# Patient Record
Sex: MALE | Race: BLACK OR AFRICAN AMERICAN | NOT HISPANIC OR LATINO | URBAN - METROPOLITAN AREA
[De-identification: names, ages, dates, MRNs, and addresses within clinical notes are randomized per-mention and may not be internally consistent; named-entity substitution may affect disease eponyms.]

---

## 2017-06-16 ENCOUNTER — INPATIENT (INPATIENT)
Facility: HOSPITAL | Age: 32
LOS: 2 days | Discharge: HOME | End: 2017-06-19
Attending: INTERNAL MEDICINE | Admitting: INTERNAL MEDICINE
Payer: COMMERCIAL

## 2017-06-16 VITALS
TEMPERATURE: 99 F | OXYGEN SATURATION: 98 % | RESPIRATION RATE: 16 BRPM | DIASTOLIC BLOOD PRESSURE: 123 MMHG | HEART RATE: 94 BPM | SYSTOLIC BLOOD PRESSURE: 179 MMHG

## 2017-06-16 DIAGNOSIS — I10 ESSENTIAL (PRIMARY) HYPERTENSION: ICD-10-CM

## 2017-06-16 DIAGNOSIS — I21.4 NON-ST ELEVATION (NSTEMI) MYOCARDIAL INFARCTION: ICD-10-CM

## 2017-06-16 LAB
ALBUMIN SERPL ELPH-MCNC: 4 G/DL — SIGNIFICANT CHANGE UP (ref 3.3–5)
ALP SERPL-CCNC: 76 U/L — SIGNIFICANT CHANGE UP (ref 40–120)
ALT FLD-CCNC: 23 U/L — SIGNIFICANT CHANGE UP (ref 4–41)
APTT BLD: 30 SEC — SIGNIFICANT CHANGE UP (ref 27.5–37.4)
APTT BLD: 31.1 SEC — SIGNIFICANT CHANGE UP (ref 27.5–37.4)
AST SERPL-CCNC: 22 U/L — SIGNIFICANT CHANGE UP (ref 4–40)
BASOPHILS # BLD AUTO: 0.02 K/UL — SIGNIFICANT CHANGE UP (ref 0–0.2)
BASOPHILS NFR BLD AUTO: 0.3 % — SIGNIFICANT CHANGE UP (ref 0–2)
BILIRUB SERPL-MCNC: 0.3 MG/DL — SIGNIFICANT CHANGE UP (ref 0.2–1.2)
BUN SERPL-MCNC: 13 MG/DL — SIGNIFICANT CHANGE UP (ref 7–23)
CALCIUM SERPL-MCNC: 9.5 MG/DL — SIGNIFICANT CHANGE UP (ref 8.4–10.5)
CHLORIDE SERPL-SCNC: 102 MMOL/L — SIGNIFICANT CHANGE UP (ref 98–107)
CK MB BLD-MCNC: 1.7 — SIGNIFICANT CHANGE UP (ref 0–2.5)
CK MB BLD-MCNC: 3.43 NG/ML — SIGNIFICANT CHANGE UP (ref 1–6.6)
CK MB BLD-MCNC: 3.79 NG/ML — SIGNIFICANT CHANGE UP (ref 1–6.6)
CK SERPL-CCNC: 199 U/L — SIGNIFICANT CHANGE UP (ref 30–200)
CK SERPL-CCNC: 238 U/L — HIGH (ref 30–200)
CO2 SERPL-SCNC: 24 MMOL/L — SIGNIFICANT CHANGE UP (ref 22–31)
CREAT SERPL-MCNC: 0.89 MG/DL — SIGNIFICANT CHANGE UP (ref 0.5–1.3)
EOSINOPHIL # BLD AUTO: 0.25 K/UL — SIGNIFICANT CHANGE UP (ref 0–0.5)
EOSINOPHIL NFR BLD AUTO: 3.7 % — SIGNIFICANT CHANGE UP (ref 0–6)
GLUCOSE SERPL-MCNC: 91 MG/DL — SIGNIFICANT CHANGE UP (ref 70–99)
HCT VFR BLD CALC: 42.7 % — SIGNIFICANT CHANGE UP (ref 39–50)
HCT VFR BLD CALC: 45 % — SIGNIFICANT CHANGE UP (ref 39–50)
HGB BLD-MCNC: 14 G/DL — SIGNIFICANT CHANGE UP (ref 13–17)
HGB BLD-MCNC: 14.4 G/DL — SIGNIFICANT CHANGE UP (ref 13–17)
IMM GRANULOCYTES NFR BLD AUTO: 0.1 % — SIGNIFICANT CHANGE UP (ref 0–1.5)
LYMPHOCYTES # BLD AUTO: 2.74 K/UL — SIGNIFICANT CHANGE UP (ref 1–3.3)
LYMPHOCYTES # BLD AUTO: 40.5 % — SIGNIFICANT CHANGE UP (ref 13–44)
MAGNESIUM SERPL-MCNC: 1.8 MG/DL — SIGNIFICANT CHANGE UP (ref 1.6–2.6)
MCHC RBC-ENTMCNC: 29.6 PG — SIGNIFICANT CHANGE UP (ref 27–34)
MCHC RBC-ENTMCNC: 29.6 PG — SIGNIFICANT CHANGE UP (ref 27–34)
MCHC RBC-ENTMCNC: 32 % — SIGNIFICANT CHANGE UP (ref 32–36)
MCHC RBC-ENTMCNC: 32.8 % — SIGNIFICANT CHANGE UP (ref 32–36)
MCV RBC AUTO: 90.3 FL — SIGNIFICANT CHANGE UP (ref 80–100)
MCV RBC AUTO: 92.6 FL — SIGNIFICANT CHANGE UP (ref 80–100)
MONOCYTES # BLD AUTO: 0.39 K/UL — SIGNIFICANT CHANGE UP (ref 0–0.9)
MONOCYTES NFR BLD AUTO: 5.8 % — SIGNIFICANT CHANGE UP (ref 2–14)
NEUTROPHILS # BLD AUTO: 3.35 K/UL — SIGNIFICANT CHANGE UP (ref 1.8–7.4)
NEUTROPHILS NFR BLD AUTO: 49.6 % — SIGNIFICANT CHANGE UP (ref 43–77)
NT-PROBNP SERPL-SCNC: 582 PG/ML — SIGNIFICANT CHANGE UP
PLATELET # BLD AUTO: 217 K/UL — SIGNIFICANT CHANGE UP (ref 150–400)
PLATELET # BLD AUTO: 230 K/UL — SIGNIFICANT CHANGE UP (ref 150–400)
PMV BLD: 10.8 FL — SIGNIFICANT CHANGE UP (ref 7–13)
PMV BLD: 9.9 FL — SIGNIFICANT CHANGE UP (ref 7–13)
POTASSIUM SERPL-MCNC: 4.9 MMOL/L — SIGNIFICANT CHANGE UP (ref 3.5–5.3)
POTASSIUM SERPL-SCNC: 4.9 MMOL/L — SIGNIFICANT CHANGE UP (ref 3.5–5.3)
PROT SERPL-MCNC: 7.7 G/DL — SIGNIFICANT CHANGE UP (ref 6–8.3)
RBC # BLD: 4.73 M/UL — SIGNIFICANT CHANGE UP (ref 4.2–5.8)
RBC # BLD: 4.86 M/UL — SIGNIFICANT CHANGE UP (ref 4.2–5.8)
RBC # FLD: 14.7 % — HIGH (ref 10.3–14.5)
RBC # FLD: 14.8 % — HIGH (ref 10.3–14.5)
SODIUM SERPL-SCNC: 140 MMOL/L — SIGNIFICANT CHANGE UP (ref 135–145)
TROPONIN T SERPL-MCNC: 0.07 NG/ML — HIGH (ref 0–0.06)
TROPONIN T SERPL-MCNC: 0.07 NG/ML — HIGH (ref 0–0.06)
TSH SERPL-MCNC: 3.64 UIU/ML — SIGNIFICANT CHANGE UP (ref 0.27–4.2)
WBC # BLD: 6.76 K/UL — SIGNIFICANT CHANGE UP (ref 3.8–10.5)
WBC # BLD: 7.72 K/UL — SIGNIFICANT CHANGE UP (ref 3.8–10.5)
WBC # FLD AUTO: 6.76 K/UL — SIGNIFICANT CHANGE UP (ref 3.8–10.5)
WBC # FLD AUTO: 7.72 K/UL — SIGNIFICANT CHANGE UP (ref 3.8–10.5)

## 2017-06-16 RX ORDER — ASPIRIN/CALCIUM CARB/MAGNESIUM 324 MG
81 TABLET ORAL DAILY
Qty: 0 | Refills: 0 | Status: DISCONTINUED | OUTPATIENT
Start: 2017-06-16 | End: 2017-06-19

## 2017-06-16 RX ORDER — METOPROLOL TARTRATE 50 MG
50 TABLET ORAL
Qty: 0 | Refills: 0 | Status: DISCONTINUED | OUTPATIENT
Start: 2017-06-16 | End: 2017-06-17

## 2017-06-16 RX ORDER — HEPARIN SODIUM 5000 [USP'U]/ML
5000 INJECTION INTRAVENOUS; SUBCUTANEOUS ONCE
Qty: 0 | Refills: 0 | Status: COMPLETED | OUTPATIENT
Start: 2017-06-16 | End: 2017-06-16

## 2017-06-16 RX ORDER — HEPARIN SODIUM 5000 [USP'U]/ML
6000 INJECTION INTRAVENOUS; SUBCUTANEOUS EVERY 6 HOURS
Qty: 0 | Refills: 0 | Status: DISCONTINUED | OUTPATIENT
Start: 2017-06-16 | End: 2017-06-17

## 2017-06-16 RX ORDER — HEPARIN SODIUM 5000 [USP'U]/ML
INJECTION INTRAVENOUS; SUBCUTANEOUS
Qty: 25000 | Refills: 0 | Status: DISCONTINUED | OUTPATIENT
Start: 2017-06-16 | End: 2017-06-17

## 2017-06-16 RX ORDER — LISINOPRIL 2.5 MG/1
10 TABLET ORAL DAILY
Qty: 0 | Refills: 0 | Status: DISCONTINUED | OUTPATIENT
Start: 2017-06-16 | End: 2017-06-18

## 2017-06-16 RX ORDER — ACETAMINOPHEN 500 MG
650 TABLET ORAL EVERY 6 HOURS
Qty: 0 | Refills: 0 | Status: DISCONTINUED | OUTPATIENT
Start: 2017-06-16 | End: 2017-06-19

## 2017-06-16 RX ORDER — FUROSEMIDE 40 MG
20 TABLET ORAL
Qty: 0 | Refills: 0 | Status: DISCONTINUED | OUTPATIENT
Start: 2017-06-16 | End: 2017-06-17

## 2017-06-16 RX ORDER — AMLODIPINE BESYLATE 2.5 MG/1
5 TABLET ORAL ONCE
Qty: 0 | Refills: 0 | Status: COMPLETED | OUTPATIENT
Start: 2017-06-16 | End: 2017-06-16

## 2017-06-16 RX ORDER — LANOLIN ALCOHOL/MO/W.PET/CERES
3 CREAM (GRAM) TOPICAL AT BEDTIME
Qty: 0 | Refills: 0 | Status: DISCONTINUED | OUTPATIENT
Start: 2017-06-16 | End: 2017-06-19

## 2017-06-16 RX ORDER — ATORVASTATIN CALCIUM 80 MG/1
20 TABLET, FILM COATED ORAL AT BEDTIME
Qty: 0 | Refills: 0 | Status: DISCONTINUED | OUTPATIENT
Start: 2017-06-16 | End: 2017-06-19

## 2017-06-16 RX ADMIN — AMLODIPINE BESYLATE 5 MILLIGRAM(S): 2.5 TABLET ORAL at 13:33

## 2017-06-16 RX ADMIN — LISINOPRIL 10 MILLIGRAM(S): 2.5 TABLET ORAL at 20:12

## 2017-06-16 RX ADMIN — HEPARIN SODIUM 1000 UNIT(S)/HR: 5000 INJECTION INTRAVENOUS; SUBCUTANEOUS at 16:32

## 2017-06-16 RX ADMIN — HEPARIN SODIUM 5000 UNIT(S): 5000 INJECTION INTRAVENOUS; SUBCUTANEOUS at 16:32

## 2017-06-16 RX ADMIN — Medication 50 MILLIGRAM(S): at 20:12

## 2017-06-16 NOTE — H&P ADULT - NSHPSOCIALHISTORY_GEN_ALL_CORE
Marital Status: Single - lives with his girlfriend with their 2 children (boys)    Occupation: Works in [employer]    Tobacco Use: neg    ETOH Use: drinks 1 day per week    Flu Vaccine:     neg                             Pneumonia Vaccine:   neg                Hep B vaccine: completed the series

## 2017-06-16 NOTE — H&P ADULT - NEGATIVE OPHTHALMOLOGIC SYMPTOMS
no blurred vision R/no loss of vision L/no blurred vision L/no loss of vision R/no photophobia/no diplopia

## 2017-06-16 NOTE — H&P ADULT - PROBLEM SELECTOR PLAN 1
ekg/telemetry, f/u ce x 2, mg, tsh, echo, heparin gtt, monitor ptt, started on beta blocker, acei, statin

## 2017-06-16 NOTE — H&P ADULT - NEGATIVE CARDIOVASCULAR SYMPTOMS
no orthopnea/no paroxysmal nocturnal dyspnea/no peripheral edema/no palpitations/no dyspnea on exertion/no chest pain

## 2017-06-16 NOTE — H&P ADULT - RS GEN PE MLT RESP DETAILS PC
breath sounds equal/no chest wall tenderness/good air movement/respirations non-labored/clear to auscultation bilaterally/airway patent

## 2017-06-16 NOTE — ED PROVIDER NOTE - OBJECTIVE STATEMENT
30 yo M with cough x4 weeks, went to urgent care and sent to ED for elevated BP. Pt states he has had mostly dry cough with intermittent whitish-yellow phlegm. 30 yo M with cough x4 weeks, went to urgent  for c/o dry cough with intermittent whitish-yellow phlegm. Pt found to have elevated BP, sent to ED for eval. CXR at urgent care (+) for "enlarged heart" Pt has been told his BP was high in the past, never started on meds. Pt denies HA, blurry vision, CP, SOB, back pain, nausea.

## 2017-06-16 NOTE — ED ADULT NURSE NOTE - CHIEF COMPLAINT QUOTE
Patient sent in by urgent care center for elevated blood pressure. Denies headache, N/V, blurry vision, chest pain, SOB. Seen at urgent care for night time/ early morning cough x 1 month. Negative chest xray at urgent care.

## 2017-06-16 NOTE — ED ADULT NURSE NOTE - OBJECTIVE STATEMENT
Facilitator RN : Pt received to intake 8 aaox3 ambulatory c/o htn.  Pt reports going to urgentcare for productive cough x 3 wks where he was found to be hypertensive.  Pt denies significant pmh, NO chest pain, SOB, NVD fevers chills, dizziness or HA.  P/w breaths even unlabored skin wrm dry intact NAD noted.  20 g IV access obtained at .  labs and meds as ordered.  Pt cleared to transport to .

## 2017-06-16 NOTE — ED PROVIDER NOTE - ATTENDING CONTRIBUTION TO CARE
agree with resident note  31 yr old male with no PMH presents from [facility] after noted to have extremely elevated BP.  States went for dry cough.  Denies CP, SOB, palpitations.  Denies drug use. Has FH of CAD grandfather in 40s    PE: extremely hypertensive, in no distress; CTAB/L; s1 s2 no m/r/g pulses equal bilateral abd soft/NT/ND ext: no edema Neuro:CNs intact 5/5 motor UE and LE; sensation intact    Imp: EKG shows lateral TWI; pt in no distress, no neuro symptoms; trop +; admit to tele; ASA and heparin for NSTEMI

## 2017-06-16 NOTE — H&P ADULT - HISTORY OF PRESENT ILLNESS
32 y/o male, with a PmHx of Obesity, presented to [facility] c/o coughing for the past 4 weeks with some nasal congestion. Pt states for the past 4 week he has been having a non-productive cough (but occasionally has some clear phlegm that comes up) and it has been getting worse so today he decided to go to an Urgent Care Center for an evaluation. There he had a CXR done and he was told he had an "enlarged heart" so he was sent to the ED for an evaluation. He denies any fever, chills, sob, HA, dizziness, blurred vision, n/v, recent travel, sick contacts. Pt states he last went to his PMD x 1 year ago for his annual physical and was told everything was okay (he can't remember his PMD's name). In the ED, he was found to have an elevated troponin in the setting of a normal kidney function so he was started on a Heparin gtt. He appears comfortable at this time and is being admitted to telemetry for NSTEMI vs Myocarditis.

## 2017-06-16 NOTE — H&P ADULT - NSHPPHYSICALEXAM_GEN_ALL_CORE
Vital Signs Last 24 Hrs  T(C): 36.8, Max: 37.1 (06-16 @ 11:26)  T(F): 98.2, Max: 98.7 (06-16 @ 11:26)  HR: 87 (87 - 94)  BP: 191/125 (174/117 - 191/125)  BP(mean): --  RR: 17 (16 - 18)  SpO2: 99% (98% - 100%)    EKG: NSR @ 91, T inv V4-6

## 2017-06-17 DIAGNOSIS — R05 COUGH: ICD-10-CM

## 2017-06-17 LAB
APTT BLD: 29.9 SEC — SIGNIFICANT CHANGE UP (ref 27.5–37.4)
B PERT DNA SPEC QL NAA+PROBE: SIGNIFICANT CHANGE UP
BUN SERPL-MCNC: 11 MG/DL — SIGNIFICANT CHANGE UP (ref 7–23)
C PNEUM DNA SPEC QL NAA+PROBE: NOT DETECTED — SIGNIFICANT CHANGE UP
CALCIUM SERPL-MCNC: 9.2 MG/DL — SIGNIFICANT CHANGE UP (ref 8.4–10.5)
CHLORIDE SERPL-SCNC: 101 MMOL/L — SIGNIFICANT CHANGE UP (ref 98–107)
CHOLEST SERPL-MCNC: 171 MG/DL — SIGNIFICANT CHANGE UP (ref 120–199)
CO2 SERPL-SCNC: 22 MMOL/L — SIGNIFICANT CHANGE UP (ref 22–31)
CREAT SERPL-MCNC: 0.74 MG/DL — SIGNIFICANT CHANGE UP (ref 0.5–1.3)
FLUAV H1 2009 PAND RNA SPEC QL NAA+PROBE: NOT DETECTED — SIGNIFICANT CHANGE UP
FLUAV H1 RNA SPEC QL NAA+PROBE: NOT DETECTED — SIGNIFICANT CHANGE UP
FLUAV H3 RNA SPEC QL NAA+PROBE: NOT DETECTED — SIGNIFICANT CHANGE UP
FLUAV SUBTYP SPEC NAA+PROBE: SIGNIFICANT CHANGE UP
FLUBV RNA SPEC QL NAA+PROBE: NOT DETECTED — SIGNIFICANT CHANGE UP
GLUCOSE SERPL-MCNC: 100 MG/DL — HIGH (ref 70–99)
HADV DNA SPEC QL NAA+PROBE: NOT DETECTED — SIGNIFICANT CHANGE UP
HCOV 229E RNA SPEC QL NAA+PROBE: NOT DETECTED — SIGNIFICANT CHANGE UP
HCOV HKU1 RNA SPEC QL NAA+PROBE: NOT DETECTED — SIGNIFICANT CHANGE UP
HCOV NL63 RNA SPEC QL NAA+PROBE: NOT DETECTED — SIGNIFICANT CHANGE UP
HCOV OC43 RNA SPEC QL NAA+PROBE: NOT DETECTED — SIGNIFICANT CHANGE UP
HCT VFR BLD CALC: 45.9 % — SIGNIFICANT CHANGE UP (ref 39–50)
HDLC SERPL-MCNC: 53 MG/DL — SIGNIFICANT CHANGE UP (ref 35–55)
HGB BLD-MCNC: 14.9 G/DL — SIGNIFICANT CHANGE UP (ref 13–17)
HMPV RNA SPEC QL NAA+PROBE: NOT DETECTED — SIGNIFICANT CHANGE UP
HPIV1 RNA SPEC QL NAA+PROBE: NOT DETECTED — SIGNIFICANT CHANGE UP
HPIV2 RNA SPEC QL NAA+PROBE: NOT DETECTED — SIGNIFICANT CHANGE UP
HPIV3 RNA SPEC QL NAA+PROBE: NOT DETECTED — SIGNIFICANT CHANGE UP
HPIV4 RNA SPEC QL NAA+PROBE: NOT DETECTED — SIGNIFICANT CHANGE UP
INR BLD: 1.1 — SIGNIFICANT CHANGE UP (ref 0.88–1.17)
LIPID PNL WITH DIRECT LDL SERPL: 106 MG/DL — SIGNIFICANT CHANGE UP
M PNEUMO DNA SPEC QL NAA+PROBE: NOT DETECTED — SIGNIFICANT CHANGE UP
MCHC RBC-ENTMCNC: 29.5 PG — SIGNIFICANT CHANGE UP (ref 27–34)
MCHC RBC-ENTMCNC: 32.5 % — SIGNIFICANT CHANGE UP (ref 32–36)
MCV RBC AUTO: 90.9 FL — SIGNIFICANT CHANGE UP (ref 80–100)
PLATELET # BLD AUTO: 220 K/UL — SIGNIFICANT CHANGE UP (ref 150–400)
PMV BLD: 10.6 FL — SIGNIFICANT CHANGE UP (ref 7–13)
POTASSIUM SERPL-MCNC: 3.9 MMOL/L — SIGNIFICANT CHANGE UP (ref 3.5–5.3)
POTASSIUM SERPL-SCNC: 3.9 MMOL/L — SIGNIFICANT CHANGE UP (ref 3.5–5.3)
PROTHROM AB SERPL-ACNC: 12.3 SEC — SIGNIFICANT CHANGE UP (ref 9.8–13.1)
RBC # BLD: 5.05 M/UL — SIGNIFICANT CHANGE UP (ref 4.2–5.8)
RBC # FLD: 14.9 % — HIGH (ref 10.3–14.5)
RSV RNA SPEC QL NAA+PROBE: NOT DETECTED — SIGNIFICANT CHANGE UP
RV+EV RNA SPEC QL NAA+PROBE: NOT DETECTED — SIGNIFICANT CHANGE UP
SODIUM SERPL-SCNC: 139 MMOL/L — SIGNIFICANT CHANGE UP (ref 135–145)
TRIGL SERPL-MCNC: 64 MG/DL — SIGNIFICANT CHANGE UP (ref 10–149)
TROPONIN T SERPL-MCNC: 0.07 NG/ML — HIGH (ref 0–0.06)
WBC # BLD: 6.31 K/UL — SIGNIFICANT CHANGE UP (ref 3.8–10.5)
WBC # FLD AUTO: 6.31 K/UL — SIGNIFICANT CHANGE UP (ref 3.8–10.5)

## 2017-06-17 PROCEDURE — 71250 CT THORAX DX C-: CPT | Mod: 26

## 2017-06-17 PROCEDURE — 71020: CPT | Mod: 26

## 2017-06-17 RX ORDER — FUROSEMIDE 40 MG
40 TABLET ORAL DAILY
Qty: 0 | Refills: 0 | Status: DISCONTINUED | OUTPATIENT
Start: 2017-06-17 | End: 2017-06-19

## 2017-06-17 RX ORDER — HEPARIN SODIUM 5000 [USP'U]/ML
5000 INJECTION INTRAVENOUS; SUBCUTANEOUS EVERY 8 HOURS
Qty: 0 | Refills: 0 | Status: DISCONTINUED | OUTPATIENT
Start: 2017-06-17 | End: 2017-06-19

## 2017-06-17 RX ORDER — CARVEDILOL PHOSPHATE 80 MG/1
6.25 CAPSULE, EXTENDED RELEASE ORAL EVERY 12 HOURS
Qty: 0 | Refills: 0 | Status: DISCONTINUED | OUTPATIENT
Start: 2017-06-17 | End: 2017-06-19

## 2017-06-17 RX ADMIN — Medication 20 MILLIGRAM(S): at 06:34

## 2017-06-17 RX ADMIN — HEPARIN SODIUM 5000 UNIT(S): 5000 INJECTION INTRAVENOUS; SUBCUTANEOUS at 13:44

## 2017-06-17 RX ADMIN — Medication 50 MILLIGRAM(S): at 06:34

## 2017-06-17 RX ADMIN — HEPARIN SODIUM 1300 UNIT(S)/HR: 5000 INJECTION INTRAVENOUS; SUBCUTANEOUS at 00:14

## 2017-06-17 RX ADMIN — CARVEDILOL PHOSPHATE 6.25 MILLIGRAM(S): 80 CAPSULE, EXTENDED RELEASE ORAL at 18:04

## 2017-06-17 RX ADMIN — HEPARIN SODIUM 1600 UNIT(S)/HR: 5000 INJECTION INTRAVENOUS; SUBCUTANEOUS at 08:21

## 2017-06-17 RX ADMIN — ATORVASTATIN CALCIUM 20 MILLIGRAM(S): 80 TABLET, FILM COATED ORAL at 00:24

## 2017-06-17 RX ADMIN — ATORVASTATIN CALCIUM 20 MILLIGRAM(S): 80 TABLET, FILM COATED ORAL at 21:40

## 2017-06-17 RX ADMIN — LISINOPRIL 10 MILLIGRAM(S): 2.5 TABLET ORAL at 06:34

## 2017-06-17 RX ADMIN — Medication 81 MILLIGRAM(S): at 13:44

## 2017-06-17 RX ADMIN — HEPARIN SODIUM 6000 UNIT(S): 5000 INJECTION INTRAVENOUS; SUBCUTANEOUS at 08:21

## 2017-06-17 RX ADMIN — HEPARIN SODIUM 6000 UNIT(S): 5000 INJECTION INTRAVENOUS; SUBCUTANEOUS at 00:24

## 2017-06-17 RX ADMIN — HEPARIN SODIUM 5000 UNIT(S): 5000 INJECTION INTRAVENOUS; SUBCUTANEOUS at 21:40

## 2017-06-17 NOTE — CONSULT NOTE ADULT - SUBJECTIVE AND OBJECTIVE BOX
Patient is a 31y old  Male who presents with a chief complaint of "I had a cough and went to Urgent Care, I was sent to the ER for high blood pressure".      HPI:  32 y/o male, with a PmHx of Obesity, presented to [facility] c/o coughing for the past 4 weeks . Pt states for the past 4 week he has been having a non-productive cough (but occasionally has some clear phlegm that comes up) and it has been getting worse so today he decided to go to an Urgent Care Center for an evaluation. There he had a CXR done and he was told he had an "enlarged heart" so he was sent to the ED for an evaluation. He denies any fever, chills, sob, HA, dizziness, blurred vision, n/v, recent travel, sick contacts. Pt states he last went to his PMD x 1 year ago for his annual physical and was told everything was okay (he can't remember his PMD's name). In the ED, he was found to have an elevated troponin in the setting of a normal kidney function so he was started on a Heparin gtt. He appears comfortable at this time and is being admitted to telemetry for NSTEMI vs Myocarditis.       PAST MEDICAL & SURGICAL HISTORY:  No pertinent past medical history  No significant past surgical history      Social History:    FAMILY HISTORY:  No pertinent family history in first degree relatives      Allergies    No Known Allergies    Intolerances        REVIEW OF SYSTEMS:    CONSTITUTIONAL: No fever, weight loss, or fatigue  EYES: No eye pain, visual disturbances, or discharge  RESPIRATORY: No cough, wheezing, chills or hemoptysis; No shortness of breath  CARDIOVASCULAR: No chest pain, palpitations, dizziness, or leg swelling  GASTROINTESTINAL: No abdominal or epigastric pain. No nausea, vomiting, or hematemesis; No diarrhea or constipation. No melena or hematochezia.  GENITOURINARY: No dysuria, frequency, hematuria, or incontinence  NEUROLOGICAL: No headaches, memory loss, loss of strength, numbness, or tremors  SKIN: No itching, burning, rashes, or lesions   ENDOCRINE: No heat or cold intolerance; No hair loss  MUSCULOSKELETAL: No joint pain or swelling; No muscle, back, or extremity pain  PSYCHIATRIC: No depression, anxiety, mood swings, or difficulty sleeping      MEDICATIONS  (STANDING):  aspirin enteric coated 81milliGRAM(s) Oral daily  lisinopril 10milliGRAM(s) Oral daily  atorvastatin 20milliGRAM(s) Oral at bedtime  carvedilol 6.25milliGRAM(s) Oral every 12 hours  heparin  Injectable 5000Unit(s) SubCutaneous every 8 hours  furosemide    Tablet 40milliGRAM(s) Oral daily    MEDICATIONS  (PRN):  acetaminophen   Tablet. 650milliGRAM(s) Oral every 6 hours PRN mild, moderate pain  melatonin 3milliGRAM(s) Oral at bedtime PRN Insomnia      Vital Signs Last 24 Hrs  T(C): 36.7, Max: 37.1 (06-16 @ 15:47)  T(F): 98, Max: 98.7 (06-16 @ 15:47)  HR: 84 (84 - 94)  BP: 155/106 (150/105 - 191/125)  BP(mean): --  RR: 17 (17 - 18)  SpO2: 98% (98% - 100%)    PHYSICAL EXAM:    GENERAL: NAD, well-groomed, well-developed,Obese  HEAD:  Atraumatic, Normocephalic  EYES: EOMI, PERRLA, conjunctiva and sclera clear  ENMT: No tonsillar erythema, exudates, or enlargement; Moist mucous membranes, Good dentition, No lesions  NECK: Supple, No JVD, Normal thyroid  NERVOUS SYSTEM:  Alert & Oriented X3, Good concentration; Motor Strength 5/5 B/L upper and lower extremities; DTRs 2+ intact and symmetric  CHEST/LUNG: Clear to percussion bilaterally; No rales, rhonchi, wheezing, or rubs  HEART: Regular rate and rhythm; No murmurs, rubs, or gallops  ABDOMEN: Soft, Nontender, Nondistended; Bowel sounds present  EXTREMITIES:  2+ Peripheral Pulses, No clubbing, cyanosis, or edema  LYMPH: No lymphadenopathy noted  SKIN: No rashes or lesions    LABS:                        14.9   6.31  )-----------( 220      ( 17 Jun 2017 07:39 )             45.9     06-17    139  |  101  |  11  ----------------------------<  100<H>  3.9   |  22  |  0.74    Ca    9.2      17 Jun 2017 07:39  Mg     1.8     06-16    TPro  7.7  /  Alb  4.0  /  TBili  0.3  /  DBili  x   /  AST  22  /  ALT  23  /  AlkPhos  76  06-16    PT/INR - ( 17 Jun 2017 07:39 )   PT: 12.3 SEC;   INR: 1.10          PTT - ( 17 Jun 2017 07:39 )  PTT:29.9 SEC        RADIOLOGY & ADDITIONAL STUDIES:

## 2017-06-17 NOTE — PROGRESS NOTE ADULT - ASSESSMENT
Poorly Controlled Hypertension  Edema  Cough    -CV status appears stable  -clinical picture and troponin level not consistent with ACS. Troponin borderline with negative CKMB and no symptoms of chest pain  -Obtain an echo to rule out effusion or cardiomyopathy  -Change lasix to 40mg PO daily  -D/C heparin gtt  -check resp viral panel  -noncon chest CT  -if blood pressure remains elevated, increase lisinopril to 20mg PO daily  -check LE dopplers

## 2017-06-17 NOTE — CONSULT NOTE ADULT - PROBLEM SELECTOR RECOMMENDATION 9
OFF Heparin and on BB plus ASA. NO chest pain or SOB. Echo pending. ?Myocarditis. Cardiology following.

## 2017-06-17 NOTE — PROGRESS NOTE ADULT - SUBJECTIVE AND OBJECTIVE BOX
CC: Pt seen and examined, please see full Hand P in sunrise  Pt is a 30 yo male presenting w 4 weeks of productive dry cough, sent in for evaluation after he was found to be hypertensive at an urgent care. Initial bloodwork in the ED revealed a minimal troponin elevation with negative CK, CKMB and pro-BNP. Pt denies any chest pain, dyspnea, palpitations, orthopnea or PND    TELEMETRY:     PHYSICAL EXAM:    T(C): 36.7, Max: 37.1 (06-16 @ 11:26)  HR: 84 (84 - 94)  BP: 155/106 (150/105 - 191/125)  RR: 17 (16 - 18)  SpO2: 98% (98% - 100%)  Wt(kg): --  I&O's Summary    I & Os for current day (as of 17 Jun 2017 09:35)  =============================================  IN: 0 ml / OUT: 1000 ml / NET: -1000 ml      Appearance: Normal	  Cardiovascular: Normal S1 S2,RRR, No JVD, No murmurs  Respiratory: Lungs clear to auscultation	  Gastrointestinal:  Soft, Non-tender, + BS	  Extremities: + nonpitting edema of the b/l LE  Vascular: Peripheral pulses palpable 2+ bilaterally     LABS:	 	                          14.9   6.31  )-----------( 220      ( 17 Jun 2017 07:39 )             45.9     06-17    139  |  101  |  11  ----------------------------<  100<H>  3.9   |  22  |  0.74    Ca    9.2      17 Jun 2017 07:39  Mg     1.8     06-16    TPro  7.7  /  Alb  4.0  /  TBili  0.3  /  DBili  x   /  AST  22  /  ALT  23  /  AlkPhos  76  06-16      PT/INR - ( 17 Jun 2017 07:39 )   PT: 12.3 SEC;   INR: 1.10          PTT - ( 17 Jun 2017 07:39 )  PTT:29.9 SEC    CARDIAC MARKERS:      CKMB: 3.43 ng/mL (06-16 @ 20:29)  CKMB: 3.79 ng/mL (06-16 @ 13:29)

## 2017-06-17 NOTE — CONSULT NOTE ADULT - ASSESSMENT
30 y/o male, with no significant  PmHx  presented to [facility] c/o coughing for the past 4 weeks .

## 2017-06-17 NOTE — CONSULT NOTE ADULT - PROBLEM SELECTOR RECOMMENDATION 3
Improving..?Viral as no associated symptom like fever,chills,night sweats PND or GERD. If continues will get CT chest without contrast.

## 2017-06-18 LAB
BUN SERPL-MCNC: 14 MG/DL — SIGNIFICANT CHANGE UP (ref 7–23)
CALCIUM SERPL-MCNC: 8.9 MG/DL — SIGNIFICANT CHANGE UP (ref 8.4–10.5)
CHLORIDE SERPL-SCNC: 101 MMOL/L — SIGNIFICANT CHANGE UP (ref 98–107)
CO2 SERPL-SCNC: 23 MMOL/L — SIGNIFICANT CHANGE UP (ref 22–31)
CREAT SERPL-MCNC: 0.83 MG/DL — SIGNIFICANT CHANGE UP (ref 0.5–1.3)
GLUCOSE SERPL-MCNC: 101 MG/DL — HIGH (ref 70–99)
HCT VFR BLD CALC: 45.4 % — SIGNIFICANT CHANGE UP (ref 39–50)
HGB BLD-MCNC: 14.8 G/DL — SIGNIFICANT CHANGE UP (ref 13–17)
MAGNESIUM SERPL-MCNC: 1.8 MG/DL — SIGNIFICANT CHANGE UP (ref 1.6–2.6)
MCHC RBC-ENTMCNC: 29.4 PG — SIGNIFICANT CHANGE UP (ref 27–34)
MCHC RBC-ENTMCNC: 32.6 % — SIGNIFICANT CHANGE UP (ref 32–36)
MCV RBC AUTO: 90.3 FL — SIGNIFICANT CHANGE UP (ref 80–100)
PLATELET # BLD AUTO: 207 K/UL — SIGNIFICANT CHANGE UP (ref 150–400)
PMV BLD: 10.7 FL — SIGNIFICANT CHANGE UP (ref 7–13)
POTASSIUM SERPL-MCNC: 3.9 MMOL/L — SIGNIFICANT CHANGE UP (ref 3.5–5.3)
POTASSIUM SERPL-SCNC: 3.9 MMOL/L — SIGNIFICANT CHANGE UP (ref 3.5–5.3)
RBC # BLD: 5.03 M/UL — SIGNIFICANT CHANGE UP (ref 4.2–5.8)
RBC # FLD: 14.8 % — HIGH (ref 10.3–14.5)
SODIUM SERPL-SCNC: 139 MMOL/L — SIGNIFICANT CHANGE UP (ref 135–145)
WBC # BLD: 5.27 K/UL — SIGNIFICANT CHANGE UP (ref 3.8–10.5)
WBC # FLD AUTO: 5.27 K/UL — SIGNIFICANT CHANGE UP (ref 3.8–10.5)

## 2017-06-18 PROCEDURE — 93970 EXTREMITY STUDY: CPT | Mod: 26

## 2017-06-18 RX ORDER — LISINOPRIL 2.5 MG/1
20 TABLET ORAL DAILY
Qty: 0 | Refills: 0 | Status: DISCONTINUED | OUTPATIENT
Start: 2017-06-18 | End: 2017-06-19

## 2017-06-18 RX ADMIN — Medication 81 MILLIGRAM(S): at 16:04

## 2017-06-18 RX ADMIN — HEPARIN SODIUM 5000 UNIT(S): 5000 INJECTION INTRAVENOUS; SUBCUTANEOUS at 05:49

## 2017-06-18 RX ADMIN — Medication 40 MILLIGRAM(S): at 05:49

## 2017-06-18 RX ADMIN — ATORVASTATIN CALCIUM 20 MILLIGRAM(S): 80 TABLET, FILM COATED ORAL at 22:05

## 2017-06-18 RX ADMIN — LISINOPRIL 20 MILLIGRAM(S): 2.5 TABLET ORAL at 16:04

## 2017-06-18 RX ADMIN — CARVEDILOL PHOSPHATE 6.25 MILLIGRAM(S): 80 CAPSULE, EXTENDED RELEASE ORAL at 05:49

## 2017-06-18 RX ADMIN — CARVEDILOL PHOSPHATE 6.25 MILLIGRAM(S): 80 CAPSULE, EXTENDED RELEASE ORAL at 18:09

## 2017-06-18 RX ADMIN — HEPARIN SODIUM 5000 UNIT(S): 5000 INJECTION INTRAVENOUS; SUBCUTANEOUS at 22:05

## 2017-06-18 RX ADMIN — LISINOPRIL 10 MILLIGRAM(S): 2.5 TABLET ORAL at 05:49

## 2017-06-18 NOTE — PROGRESS NOTE ADULT - SUBJECTIVE AND OBJECTIVE BOX
INTERVAL HPI/OVERNIGHT EVENTS: Feel fine with no CP or SOB.   Vital Signs Last 24 Hrs  T(C): 36.3, Max: 36.8 (06-17 @ 21:36)  T(F): 97.3, Max: 98.2 (06-17 @ 21:36)  HR: 81 (81 - 88)  BP: 170/120 (146/92 - 170/120)  BP(mean): --  RR: 18 (18 - 18)  SpO2: 96% (95% - 98%)  I&O's Summary  I & Os for 24h ending 18 Jun 2017 07:00  =============================================  IN: 1160 ml / OUT: 3075 ml / NET: -1915 ml    I & Os for current day (as of 18 Jun 2017 16:21)  =============================================  IN: 0 ml / OUT: 725 ml / NET: -725 ml    MEDICATIONS  (STANDING):  aspirin enteric coated 81milliGRAM(s) Oral daily  atorvastatin 20milliGRAM(s) Oral at bedtime  carvedilol 6.25milliGRAM(s) Oral every 12 hours  heparin  Injectable 5000Unit(s) SubCutaneous every 8 hours  furosemide    Tablet 40milliGRAM(s) Oral daily  lisinopril 20milliGRAM(s) Oral daily    MEDICATIONS  (PRN):  acetaminophen   Tablet. 650milliGRAM(s) Oral every 6 hours PRN mild, moderate pain  melatonin 3milliGRAM(s) Oral at bedtime PRN Insomnia    LABS:                        14.8   5.27  )-----------( 207      ( 18 Jun 2017 07:00 )             45.4     06-18    139  |  101  |  14  ----------------------------<  101<H>  3.9   |  23  |  0.83    Ca    8.9      18 Jun 2017 07:17  Mg     1.8     06-18      PT/INR - ( 17 Jun 2017 07:39 )   PT: 12.3 SEC;   INR: 1.10          PTT - ( 17 Jun 2017 07:39 )  PTT:29.9 SEC    CAPILLARY BLOOD GLUCOSE        Consultant(s) Notes Reviewed:  [x ] YES  [ ] NO    PHYSICAL EXAM:  GENERAL: NAD, well-groomed, well-developed,Obese   HEAD:  Atraumatic, Normocephalic  EYES: EOMI, PERRLA, conjunctiva and sclera clear  ENMT: No tonsillar erythema, exudates, or enlargement; Moist mucous membranes, Good dentition, No lesions  NECK: Supple, No JVD, Normal thyroid  NERVOUS SYSTEM:  Alert & Oriented X3, Good concentration; Motor Strength 5/5 B/L upper and lower extremities; DTRs 2+ intact and symmetric  CHEST/LUNG: Clear to percussion bilaterally; No rales, rhonchi, wheezing, or rubs  HEART: Regular rate and rhythm; No murmurs, rubs, or gallops  ABDOMEN: Soft, Nontender, Nondistended; Bowel sounds present  EXTREMITIES:  2+ Peripheral Pulses, No clubbing, cyanosis, or edema  LYMPH: No lymphadenopathy noted  SKIN: No rashes or lesions    Care Discussed with Consultants/Other Providers [ x] YES  [ ] NO

## 2017-06-18 NOTE — DIETITIAN INITIAL EVALUATION ADULT. - OTHER INFO
Pt states that his appetite has been good and he has been eating well.  Instructed Pt and wife on DASH diet as well as strategies for weight loss. Written information was given to Pt. All questions answered. Pt had no complaints of GI distress nor difficulty chewing or swallowing.

## 2017-06-18 NOTE — PROGRESS NOTE ADULT - SUBJECTIVE AND OBJECTIVE BOX
CC: no cp/sob    TELEMETRY:     PHYSICAL EXAM:    T(C): 36.7, Max: 36.9 (06-17 @ 12:21)  HR: 84 (81 - 88)  BP: 146/92 (145/95 - 153/101)  RR: 18 (18 - 18)  SpO2: 95% (95% - 100%)  Wt(kg): --  I&O's Summary  I & Os for 24h ending 18 Jun 2017 07:00  =============================================  IN: 1160 ml / OUT: 3075 ml / NET: -1915 ml    I & Os for current day (as of 18 Jun 2017 10:29)  =============================================  IN: 0 ml / OUT: 725 ml / NET: -725 ml      Appearance: Normal	  Cardiovascular: Normal S1 S2,RRR, No JVD, No murmurs  Respiratory: Lungs clear to auscultation	  Gastrointestinal:  Soft, Non-tender, + BS	  Extremities: Normal range of motion, No clubbing, cyanosis or edema  Vascular: Peripheral pulses palpable 2+ bilaterally     LABS:	 	                          14.8   5.27  )-----------( 207      ( 18 Jun 2017 07:00 )             45.4     06-18    139  |  101  |  14  ----------------------------<  101<H>  3.9   |  23  |  0.83    Ca    8.9      18 Jun 2017 07:17  Mg     1.8     06-18    TPro  7.7  /  Alb  4.0  /  TBili  0.3  /  DBili  x   /  AST  22  /  ALT  23  /  AlkPhos  76  06-16      PT/INR - ( 17 Jun 2017 07:39 )   PT: 12.3 SEC;   INR: 1.10          PTT - ( 17 Jun 2017 07:39 )  PTT:29.9 SEC    CARDIAC MARKERS:

## 2017-06-18 NOTE — PROGRESS NOTE ADULT - ASSESSMENT
Poorly Controlled Hypertension  Edema  Cough    -CV status appears stable  -clinical picture and troponin level not consistent with ACS. Troponin borderline with negative CKMB and no symptoms of chest pain  -Obtain an echo to rule out effusion or cardiomyopathy  -lasix 40mg PO daily  -chest CT neg  -inrease lisinopril to 20mg PO daily  -check LE dopplers

## 2017-06-19 VITALS
TEMPERATURE: 98 F | HEART RATE: 98 BPM | SYSTOLIC BLOOD PRESSURE: 135 MMHG | DIASTOLIC BLOOD PRESSURE: 92 MMHG | OXYGEN SATURATION: 100 % | RESPIRATION RATE: 18 BRPM

## 2017-06-19 LAB
BUN SERPL-MCNC: 13 MG/DL — SIGNIFICANT CHANGE UP (ref 7–23)
CALCIUM SERPL-MCNC: 9 MG/DL — SIGNIFICANT CHANGE UP (ref 8.4–10.5)
CHLORIDE SERPL-SCNC: 102 MMOL/L — SIGNIFICANT CHANGE UP (ref 98–107)
CO2 SERPL-SCNC: 24 MMOL/L — SIGNIFICANT CHANGE UP (ref 22–31)
CREAT SERPL-MCNC: 0.85 MG/DL — SIGNIFICANT CHANGE UP (ref 0.5–1.3)
GLUCOSE SERPL-MCNC: 101 MG/DL — HIGH (ref 70–99)
HCT VFR BLD CALC: 44.7 % — SIGNIFICANT CHANGE UP (ref 39–50)
HGB BLD-MCNC: 14.8 G/DL — SIGNIFICANT CHANGE UP (ref 13–17)
MAGNESIUM SERPL-MCNC: 2.6 MG/DL — SIGNIFICANT CHANGE UP (ref 1.6–2.6)
MCHC RBC-ENTMCNC: 30.1 PG — SIGNIFICANT CHANGE UP (ref 27–34)
MCHC RBC-ENTMCNC: 33.1 % — SIGNIFICANT CHANGE UP (ref 32–36)
MCV RBC AUTO: 90.9 FL — SIGNIFICANT CHANGE UP (ref 80–100)
PLATELET # BLD AUTO: 212 K/UL — SIGNIFICANT CHANGE UP (ref 150–400)
PMV BLD: 10.5 FL — SIGNIFICANT CHANGE UP (ref 7–13)
POTASSIUM SERPL-MCNC: 4.2 MMOL/L — SIGNIFICANT CHANGE UP (ref 3.5–5.3)
POTASSIUM SERPL-SCNC: 4.2 MMOL/L — SIGNIFICANT CHANGE UP (ref 3.5–5.3)
RBC # BLD: 4.92 M/UL — SIGNIFICANT CHANGE UP (ref 4.2–5.8)
RBC # FLD: 14.9 % — HIGH (ref 10.3–14.5)
SODIUM SERPL-SCNC: 138 MMOL/L — SIGNIFICANT CHANGE UP (ref 135–145)
WBC # BLD: 5.58 K/UL — SIGNIFICANT CHANGE UP (ref 3.8–10.5)
WBC # FLD AUTO: 5.58 K/UL — SIGNIFICANT CHANGE UP (ref 3.8–10.5)

## 2017-06-19 PROCEDURE — 93306 TTE W/DOPPLER COMPLETE: CPT | Mod: 26

## 2017-06-19 RX ORDER — ASPIRIN/CALCIUM CARB/MAGNESIUM 324 MG
1 TABLET ORAL
Qty: 0 | Refills: 0 | COMMUNITY
Start: 2017-06-19

## 2017-06-19 RX ORDER — FUROSEMIDE 40 MG
1 TABLET ORAL
Qty: 30 | Refills: 0 | OUTPATIENT
Start: 2017-06-19 | End: 2017-07-19

## 2017-06-19 RX ORDER — ATORVASTATIN CALCIUM 80 MG/1
1 TABLET, FILM COATED ORAL
Qty: 30 | Refills: 0 | OUTPATIENT
Start: 2017-06-19 | End: 2017-07-19

## 2017-06-19 RX ORDER — CARVEDILOL PHOSPHATE 80 MG/1
1 CAPSULE, EXTENDED RELEASE ORAL
Qty: 60 | Refills: 0 | OUTPATIENT
Start: 2017-06-19 | End: 2017-07-19

## 2017-06-19 RX ORDER — LISINOPRIL 2.5 MG/1
1 TABLET ORAL
Qty: 30 | Refills: 0 | OUTPATIENT
Start: 2017-06-19 | End: 2017-07-19

## 2017-06-19 RX ADMIN — Medication 40 MILLIGRAM(S): at 05:55

## 2017-06-19 RX ADMIN — HEPARIN SODIUM 5000 UNIT(S): 5000 INJECTION INTRAVENOUS; SUBCUTANEOUS at 05:55

## 2017-06-19 RX ADMIN — CARVEDILOL PHOSPHATE 6.25 MILLIGRAM(S): 80 CAPSULE, EXTENDED RELEASE ORAL at 05:55

## 2017-06-19 RX ADMIN — ATORVASTATIN CALCIUM 20 MILLIGRAM(S): 80 TABLET, FILM COATED ORAL at 22:50

## 2017-06-19 RX ADMIN — LISINOPRIL 20 MILLIGRAM(S): 2.5 TABLET ORAL at 05:55

## 2017-06-19 RX ADMIN — Medication 81 MILLIGRAM(S): at 14:33

## 2017-06-19 NOTE — PROGRESS NOTE ADULT - SUBJECTIVE AND OBJECTIVE BOX
Pt s/p cardiac cath    results: normal coronary arteries  LVEF: 45%  LVEDP: 17    Pt tolerated procedure well    Plan to continue current medical management of hypertension   Continue medical management of NICM  DC Home later today    Followup with me on 7/6/17 @ 1 PM

## 2017-06-19 NOTE — PROGRESS NOTE ADULT - SUBJECTIVE AND OBJECTIVE BOX
INTERVAL HPI/OVERNIGHT EVENTS: I feel fine with no CP or SOB.   Vital Signs Last 24 Hrs  T(C): 36.6, Max: 36.9 (06-18 @ 18:14)  T(F): 97.9, Max: 98.4 (06-18 @ 18:14)  HR: 78 (78 - 97)  BP: 143/85 (133/87 - 143/85)  BP(mean): --  RR: 18 (18 - 18)  SpO2: 100% (97% - 100%)  I&O's Summary  I & Os for 24h ending 19 Jun 2017 07:00  =============================================  IN: 0 ml / OUT: 1475 ml / NET: -1475 ml    I & Os for current day (as of 19 Jun 2017 16:40)  =============================================  IN: 0 ml / OUT: 600 ml / NET: -600 ml    MEDICATIONS  (STANDING):  aspirin enteric coated 81milliGRAM(s) Oral daily  atorvastatin 20milliGRAM(s) Oral at bedtime  carvedilol 6.25milliGRAM(s) Oral every 12 hours  heparin  Injectable 5000Unit(s) SubCutaneous every 8 hours  furosemide    Tablet 40milliGRAM(s) Oral daily  lisinopril 20milliGRAM(s) Oral daily    MEDICATIONS  (PRN):  acetaminophen   Tablet. 650milliGRAM(s) Oral every 6 hours PRN mild, moderate pain  melatonin 3milliGRAM(s) Oral at bedtime PRN Insomnia    LABS:                        14.8   5.58  )-----------( 212      ( 19 Jun 2017 06:50 )             44.7     06-19    138  |  102  |  13  ----------------------------<  101<H>  4.2   |  24  |  0.85    Ca    9.0      19 Jun 2017 06:50  Mg     2.6     06-19          CAPILLARY BLOOD GLUCOSE          REVIEW OF SYSTEMS:  CONSTITUTIONAL: No fever, weight loss, or fatigue  EYES: No eye pain, visual disturbances, or discharge  ENMT:  No difficulty hearing, tinnitus, vertigo; No sinus or throat pain  NECK: No pain or stiffness  BREASTS: No pain, masses, or nipple discharge  RESPIRATORY: No cough, wheezing, chills or hemoptysis; No shortness of breath  CARDIOVASCULAR: No chest pain, palpitations, dizziness, or leg swelling  GASTROINTESTINAL: No abdominal or epigastric pain. No nausea, vomiting, or hematemesis; No diarrhea or constipation. No melena or hematochezia.  GENITOURINARY: No dysuria, frequency, hematuria, or incontinence  NEUROLOGICAL: No headaches, memory loss, loss of strength, numbness, or tremors  SKIN: No itching, burning, rashes, or lesions   LYMPH NODES: No enlarged glands  ENDOCRINE: No heat or cold intolerance; No hair loss  MUSCULOSKELETAL: No joint pain or swelling; No muscle, back, or extremity pain  PSYCHIATRIC: No depression, anxiety, mood swings, or difficulty sleeping  HEME/LYMPH: No easy bruising, or bleeding gums  ALLERY AND IMMUNOLOGIC: No hives or eczema    RADIOLOGY & ADDITIONAL TESTS:    Imaging Personally Reviewed:  [ ] YES  [ ] NO    Consultant(s) Notes Reviewed:  [x ] YES  [ ] NO    PHYSICAL EXAM:  GENERAL: NAD, well-groomed, well-developed  HEAD:  Atraumatic, Normocephalic  EYES: EOMI, PERRLA, conjunctiva and sclera clear  ENMT: No tonsillar erythema, exudates, or enlargement; Moist mucous membranes, Good dentition, No lesions  NECK: Supple, No JVD, Normal thyroid  NERVOUS SYSTEM:  Alert & Oriented X3, Good concentration; Motor Strength 5/5 B/L upper and lower extremities; DTRs 2+ intact and symmetric  CHEST/LUNG: Clear to percussion bilaterally; No rales, rhonchi, wheezing, or rubs  HEART: Regular rate and rhythm; No murmurs, rubs, or gallops  ABDOMEN: Soft, Nontender, Nondistended; Bowel sounds present  EXTREMITIES:  2+ Peripheral Pulses, No clubbing, cyanosis, or edema  LYMPH: No lymphadenopathy noted  SKIN: No rashes or lesions    Care Discussed with Consultants/Other Providers [ x] YES  [ ] NO

## 2017-06-19 NOTE — DISCHARGE NOTE ADULT - CARE PROVIDER_API CALL
Mohan Rivas), Cardiology; Internal Medicine  [address] [place] [address]  Phone: [phone]  Fax: [phone]

## 2017-06-19 NOTE — DISCHARGE NOTE ADULT - PATIENT PORTAL LINK FT
“You can access the [facility] Patient Portal, offered by [facility], by registering with the following website: [email or web address]”

## 2017-06-19 NOTE — DISCHARGE NOTE ADULT - CARE PLAN
Principal Discharge DX:	Systolic congestive heart failure  Goal:	Resolution of symptoms and close cardiology follow up.  Instructions for follow-up, activity and diet:	Continue medications as prescribed: lasix, coreg, lisinopril, lipitor and aspirin.   Low salt, low fat, fluid restricted (less than 1.5 L/day) diet.  Monitor your daily morning weight.  Follow up with Dr. Rivas as scheduled.  Secondary Diagnosis:	Hypertension  Instructions for follow-up, activity and diet:	Continue medications as prescribed: lasix, coreg, lisinopril, lipitor and aspirin. Low salt diet. Follow up with Dr. Rivas as scheduled.

## 2017-06-19 NOTE — DISCHARGE NOTE ADULT - PLAN OF CARE
Resolution of symptoms and close cardiology follow up. Continue medications as prescribed: lasix, coreg, lisinopril, lipitor and aspirin.   Low salt, low fat, fluid restricted (less than 1.5 L/day) diet.  Monitor your daily morning weight.  Follow up with Dr. Rivas as scheduled. Continue medications as prescribed: lasix, coreg, lisinopril, lipitor and aspirin. Low salt diet. Follow up with Dr. Rivas as scheduled.

## 2017-06-19 NOTE — DISCHARGE NOTE ADULT - HOSPITAL COURSE
HPI:  32 y/o male, with a PmHx of Obesity, presented to [facility] c/o coughing for the past 4 weeks with some nasal congestion. Pt states for the past 4 week he has been having a non-productive cough (but occasionally has some clear phlegm that comes up) and it has been getting worse so today he decided to go to an Urgent Care Center for an evaluation. There he had a CXR done and he was told he had an "enlarged heart" so he was sent to the ED for an evaluation. He denies any fever, chills, sob, HA, dizziness, blurred vision, n/v, recent travel, sick contacts. Pt states he last went to his PMD x 1 year ago for his annual physical and was told everything was okay (he can't remember his PMD's name). In the ED, he was found to have an elevated troponin in the setting of a normal kidney function so he was started on a Heparin gtt. He appears comfortable at this time and is being admitted to telemetry for NSTEMI vs Myocarditis. (16 Jun 2017 18:19)    On admission:  EKG: NSR @ 91, T inv V4-6  CE x 2 neg (CK: 238 > 199) ; Trop: 0.07 > 0.07 >0.07, CKMB-- negative               BNP: 582.0  CXR -- no acute changes  CHOL-- 171, TRIGLYC-- 64, HDL-- 53, LDL -- 106     Cardiology evaluated the patient: Poorly Controlled Hypertension, Edema, Cough. CV status appears stable. Clinical picture and troponin level not consistent with ACS. Troponin borderline with negative CKMB and no symptoms of chest pain. Obtain an echo to rule out effusion or cardiomyopathy. Change lasix from IV to 40mg PO daily. D/C heparin gtt that was started in ED. RVP negative. Check noncon chest CT and LE dopplers.  Lisinopril increased to 20mg po qd for better BP control.     6/17 CT/chest -- no acute changes   6/18 LE dopplers: 1.  No evidence of deep venous thrombosis in the right and left lower extremities. 2.  No evidence of deep and superficial venous insufficiency noted in the right and left lower extremities.  6/19 Cards: New Systolic CHF, Moderate Cardiomyopathy, Hypertensive Urgency, Morbid Obesity, CV stable, plan for cardiac cath today to rule out IHD, cont current meds, BP improved  6/19 Echo: EF 37% 1. Normal mitral valve. Minimal mitral regurgitation. 2. Moderate left ventricular enlargement.  3. Endocardium not well visualized; grossly moderate to severe global left ventricular systolic dysfunction.  Endocardial visualization enhanced with intravenous injection of echo contrast (Definity).  4. The right ventricle is not well visualized; grossly normal right ventricular systolic function.  Unable to exclude endocarditis.  Consider ISHAAN for further evaluation, if clinically indicated.    Patient with new systolic CHF, moderate cardiomyopathy, hypertensive urgency, morbid obesity.  CV stable.  Plan for cardiac cath to rule out IHD.  BP improved. Patient underwent LHC on 6/19: normal coronaries, EF 45%, LVEDP 17, RRA accessed. Pt tolerated procedure well. Plan to continue current medical management of hypertension. Continue medical management of NICM.  Cleared by Dr. Rivas (cardiology) for discharge home.  Patient to follow up with Dr. Rivas on 7/6/17 at 1pm. HPI:  30 y/o male, with a PmHx of Obesity, presented to [facility] c/o coughing for the past 4 weeks with some nasal congestion. Pt states for the past 4 week he has been having a non-productive cough (but occasionally has some clear phlegm that comes up) and it has been getting worse so today he decided to go to an Urgent Care Center for an evaluation. There he had a CXR done and he was told he had an "enlarged heart" so he was sent to the ED for an evaluation. He denies any fever, chills, sob, HA, dizziness, blurred vision, n/v, recent travel, sick contacts. Pt states he last went to his PMD x 1 year ago for his annual physical and was told everything was okay (he can't remember his PMD's name). In the ED, he was found to have an elevated troponin in the setting of a normal kidney function so he was started on a Heparin gtt. He appears comfortable at this time and is being admitted to telemetry for NSTEMI vs Myocarditis. (16 Jun 2017 18:19)    On admission:  EKG: NSR @ 91, T inv V4-6  CE x 2 neg (CK: 238 > 199) ; Trop: 0.07 > 0.07 >0.07, CKMB-- negative               BNP: 582.0  CXR -- no acute changes  CHOL-- 171, TRIGLYC-- 64, HDL-- 53, LDL -- 106     Cardiology evaluated the patient: Poorly Controlled Hypertension, Edema, Cough. CV status appears stable. Clinical picture and troponin level not consistent with ACS. Troponin borderline with negative CKMB and no symptoms of chest pain. Obtain an echo to rule out effusion or cardiomyopathy. Change lasix from IV to 40mg PO daily. D/C heparin gtt that was started in ED. RVP negative. Check noncon chest CT and LE dopplers.  Lisinopril increased to 20mg po qd for better BP control.     6/17 CT/chest -- no acute changes   6/18 LE dopplers: 1.  No evidence of deep venous thrombosis in the right and left lower extremities. 2.  No evidence of deep and superficial venous insufficiency noted in the right and left lower extremities.  6/19 Cards: New Systolic CHF, Moderate Cardiomyopathy, Hypertensive Urgency, Morbid Obesity, CV stable, plan for cardiac cath today to rule out IHD, cont current meds, BP improved  6/19 Echo: EF 37% 1. Normal mitral valve. Minimal mitral regurgitation. 2. Moderate left ventricular enlargement.  3. Endocardium not well visualized; grossly moderate to severe global left ventricular systolic dysfunction.  Endocardial visualization enhanced with intravenous injection of echo contrast (Definity).  4. The right ventricle is not well visualized; grossly normal right ventricular systolic function.  Unable to exclude endocarditis.  Consider ISHAAN for further evaluation, if clinically indicated.    Patient with new systolic CHF, moderate cardiomyopathy, hypertensive urgency, morbid obesity.  CV stable.  Plan for cardiac cath to rule out IHD.  BP improved. Patient underwent LHC on 6/19: normal coronaries, EF 45%, LVEDP 17, RRA accessed. Pt tolerated procedure well. Plan to continue current medical management of hypertension. Continue medical management of NICM.  Patient discharged on coreg 6.25mg po q12h, lasix 40mg po qd, lisinopril 20mg po qd, asa 81mg po qd, and lipitor 20mg po qhs. Cleared by Dr. Rivas (cardiology) for discharge home.  Patient to follow up with Dr. Rivas on 7/6/17 at 1pm.

## 2017-06-19 NOTE — DISCHARGE NOTE ADULT - ADDITIONAL INSTRUCTIONS
Follow up with Dr. Rivas on 7/6/17 at 1pm; appointment has been made for you.    Monitor cardiac catheterization site for signs of bleeding, increased bruising, swelling, or discharge. If you experience any of these symptoms, please follow up with your primary care physician or return to the hospital immediately. Do not submerge the site in water (bathe or swim). You may shower. No strenuous activity for 3 weeks. Do not drive for 48hrs following angiogram.

## 2017-06-19 NOTE — DISCHARGE NOTE ADULT - MEDICATION SUMMARY - MEDICATIONS TO TAKE
I will START or STAY ON the medications listed below when I get home from the hospital:    aspirin 81 mg oral delayed release tablet  -- 1 tab(s) by mouth once a day  -- Indication: For Prevention of Coronary Artery Disease    lisinopril 20 mg oral tablet  -- 1 tab(s) by mouth once a day  -- Indication: For High blood pressure    atorvastatin 20 mg oral tablet  -- 1 tab(s) by mouth once a day (at bedtime)  -- Indication: For High cholesterol    carvedilol 6.25 mg oral tablet  -- 1 tab(s) by mouth every 12 hours  -- Indication: For High blood pressure    furosemide 40 mg oral tablet  -- 1 tab(s) by mouth once a day  -- Indication: For Congestive Heart Failure I will START or STAY ON the medications listed below when I get home from the hospital:    Above patient may return to work on Saturday June 24th  -- Indication: For work note    aspirin 81 mg oral delayed release tablet  -- 1 tab(s) by mouth once a day  -- Indication: For Prevention of Coronary Artery Disease    lisinopril 20 mg oral tablet  -- 1 tab(s) by mouth once a day  -- Indication: For High blood pressure    atorvastatin 20 mg oral tablet  -- 1 tab(s) by mouth once a day (at bedtime)  -- Indication: For High cholesterol    carvedilol 6.25 mg oral tablet  -- 1 tab(s) by mouth every 12 hours  -- Indication: For High blood pressure    furosemide 40 mg oral tablet  -- 1 tab(s) by mouth once a day  -- Indication: For Congestive Heart Failure

## 2017-06-19 NOTE — PROGRESS NOTE ADULT - ASSESSMENT
New Systolic CHF  Moderate Cardiomyopathy  Hypertensive Urgency  Morbid Obesity    -CV stable  -plan for cardiac cath today to rule out IHD  -cont current meds  -BP improved

## 2017-06-19 NOTE — PROGRESS NOTE ADULT - SUBJECTIVE AND OBJECTIVE BOX
CC; No CP/SOB    TELEMETRY:     PHYSICAL EXAM:    T(C): 36.6, Max: 36.9 (06-18 @ 18:14)  HR: 81 (80 - 97)  BP: 133/87 (133/87 - 170/120)  RR: 18 (18 - 18)  SpO2: 100% (96% - 100%)  Wt(kg): --  I&O's Summary  I & Os for 24h ending 19 Jun 2017 07:00  =============================================  IN: 0 ml / OUT: 1475 ml / NET: -1475 ml    I & Os for current day (as of 19 Jun 2017 12:13)  =============================================  IN: 0 ml / OUT: 600 ml / NET: -600 ml      Appearance: Normal	  Cardiovascular: Normal S1 S2,RRR, No JVD, No murmurs  Respiratory: Lungs clear to auscultation	  Gastrointestinal:  Soft, Non-tender, + BS	  Extremities: Normal range of motion, No clubbing, cyanosis or edema  Vascular: Peripheral pulses palpable 2+ bilaterally     LABS:	 	                          14.8   5.58  )-----------( 212      ( 19 Jun 2017 06:50 )             44.7     06-19    138  |  102  |  13  ----------------------------<  101<H>  4.2   |  24  |  0.85    Ca    9.0      19 Jun 2017 06:50  Mg     2.6     06-19            CARDIAC MARKERS:

## 2017-06-19 NOTE — CHART NOTE - NSCHARTNOTEFT_GEN_A_CORE
RRA site is without hematoma or bleed.  RRA pulse is +2.  R hand is warm and there is good capillary refill.

## 2022-06-09 NOTE — ED ADULT TRIAGE NOTE - CHIEF COMPLAINT QUOTE
Patient sent in by urgent care center elevated blood pressure. Denies headache, N/V, blurry vision, chest pain, SOB. Seen at urgent care for night time/ early morning cough. Negative chest xray at urgent care. Patient sent in by urgent care center for elevated blood pressure. Denies headache, N/V, blurry vision, chest pain, SOB. Seen at urgent care for night time/ early morning cough. Negative chest xray at urgent care. Patient sent in by urgent care center for elevated blood pressure. Denies headache, N/V, blurry vision, chest pain, SOB. Seen at urgent care for night time/ early morning cough x 1 month. Negative chest xray at urgent care. [facility] Counseling:  I discussed with the patient the risks of golimumab including but not limited to myelosuppression, immunosuppression, autoimmune hepatitis, demyelinating diseases, lymphoma, and serious infections.  The patient understands that monitoring is required including a PPD at baseline and must alert us or the primary physician if symptoms of infection or other concerning signs are noted.

## 2024-01-10 NOTE — ED PROVIDER NOTE - TOBACCO USE
Referral received from Dr Damon.  Referral scanned to EMR.  Will schedule if/when patient calls.   Audio/tymp   Never smoker